# Patient Record
Sex: MALE | Race: WHITE
[De-identification: names, ages, dates, MRNs, and addresses within clinical notes are randomized per-mention and may not be internally consistent; named-entity substitution may affect disease eponyms.]

---

## 2019-10-15 ENCOUNTER — HOSPITAL ENCOUNTER (OUTPATIENT)
Dept: HOSPITAL 25 - CHICATH | Age: 69
Discharge: ANOTHER HOSPITAL | End: 2019-10-15
Attending: SPECIALIST
Payer: MEDICARE

## 2019-10-15 VITALS — DIASTOLIC BLOOD PRESSURE: 65 MMHG | SYSTOLIC BLOOD PRESSURE: 128 MMHG

## 2019-10-15 DIAGNOSIS — G47.33: ICD-10-CM

## 2019-10-15 DIAGNOSIS — Z79.84: ICD-10-CM

## 2019-10-15 DIAGNOSIS — E11.9: ICD-10-CM

## 2019-10-15 DIAGNOSIS — Z87.891: ICD-10-CM

## 2019-10-15 DIAGNOSIS — I25.119: Primary | ICD-10-CM

## 2019-10-15 DIAGNOSIS — I10: ICD-10-CM

## 2019-10-15 DIAGNOSIS — E78.00: ICD-10-CM

## 2019-10-15 DIAGNOSIS — K21.9: ICD-10-CM

## 2019-10-15 LAB
ANION GAP SERPL CALC-SCNC: 9 MMOL/L (ref 2–11)
BUN SERPL-MCNC: 14 MG/DL (ref 6–24)
BUN/CREAT SERPL: 19.2 (ref 8–20)
CALCIUM SERPL-MCNC: 9.4 MG/DL (ref 8.6–10.3)
CHLORIDE SERPL-SCNC: 103 MMOL/L (ref 101–111)
GLUCOSE SERPL-MCNC: 181 MG/DL (ref 70–100)
HCO3 SERPL-SCNC: 25 MMOL/L (ref 22–32)
POTASSIUM SERPL-SCNC: 4 MMOL/L (ref 3.5–5)
SODIUM SERPL-SCNC: 137 MMOL/L (ref 135–145)

## 2019-10-15 PROCEDURE — 99157 MOD SED OTHER PHYS/QHP EA: CPT

## 2019-10-15 PROCEDURE — 99156 MOD SED OTH PHYS/QHP 5/>YRS: CPT

## 2019-10-15 PROCEDURE — 36415 COLL VENOUS BLD VENIPUNCTURE: CPT

## 2019-10-15 PROCEDURE — 93458 L HRT ARTERY/VENTRICLE ANGIO: CPT

## 2019-10-15 PROCEDURE — 80048 BASIC METABOLIC PNL TOTAL CA: CPT

## 2019-10-15 NOTE — CATH
*[facility]*

[place] [address]

Main: [phone]

Fax: [phone]

[email or web address]



-------------------------------------------------------------------

Cardiac Catheterization



Patient: Reji Sheikh             MRN:        [id number]

:     1950                         Study Date: 10/15/2019

Age:     69                                 Accession#: [id number]

Gender:  M                                  HR:

Height:  67 in /170.2 cm                    BSA:        2.36 m^2

Weight:  247.9 lb /112.7 kg                 BMI:        38.9 kg/m^2



Cardiologist:        Shawn Cordova MD

 

Ordering Physician:  Shawn Cordova MD

Referring Physician: Shawn Cordova MD D,

 

-------------------------------------------------------------------



- Right coronary angiography.

- Left coronary angiography.

- Left heart catheterization with angiography.



-------------------------------------------------------------------



Summary:



1. LAD: Mid-vessel lesion: There is a 90% stenosis.

2. 1st diagonal: Proximal vessel lesion: There is a 90% stenosis.

3. Left circumflex: Proximal vessel lesion: There is a 95%

   stenosis. Mid-vessel lesion: There is a 90% stenosis.

4. 1st obtuse marginal: Proximal vessel lesion: There is a 90%

   stenosis.

5. 2nd obtuse marginal: Proximal vessel lesion: There is a 90%

   stenosis.

6. Right coronary: Proximal vessel lesion: There is a 100%

   stenosis.

7. Left ventricle: Systolic function is normal. The estimated

   ejection fraction is 55-60%.

8. Severe 3 vessle CAD.



Recommendations:  Urgent CABG Case discussed with Dr Martines.

 

-------------------------------------------------------------------

History:   Risk factors:  Hypertension. Diabetes mellitus; on

therapy with insulin.  Medications:   The patient received no

antianginal therapy in the last two weeks.



Labs, prior tests, procedures, and surgery:

Stress myocardial perfusion imaging.     Abnormal.   High risk of

ischemia.



Blood tests:    International normalized ratio (INR) of 1.02.

Partial thromboplastin time (PTT) of 34.7 sec.  Serum sodium (Na)

of 136 mEq/l.  Serum creatinine (current admission) of 0.71 mg/dl.

Blood urea nitrogen of 14 mg/dl.  Glucose of 248 mg/dl.  Platelet

count of 222 th/ul.  White blood cell count (WBC) of 0.01 th/ul.

Red blood [place] (R[place]) [place] 47003 th/ul.  Hematocrit of 45 %.

Hemoglobin (pre-procedure) of 15.1 g/dl.



-------------------------------------------------------------------

Study data:   Study status:  Cardiac cath: elective.  Location:

Catheterization laboratory.  Consent:  The risks, benefits, and

alternatives to the procedure were explained to the patient and/or

their healthcare representative and written informed consent was

obtained. All available pre-procedure labs were reviewed.  Height:

170.2 cm. 67 in.  Weight:  112.7 kg. 247.9 lb.  Body surface area:

  2.36 m^2.  Body mass index:  38.9 kg/m^2.



-------------------------------------------------------------------

Procedure:



1.  Initial setup. The patient was brought to the laboratory.

    Surface ECG leads, blood pressure measurements, and pulse

    oximetric signals were monitored. A baseline seven lead ECG was

    recorded. A time out was observed per protocol.

2.  Skin preparation. The planned puncture sites were prepped and

    draped in the usual sterile manner.

3.  Local anesthesia. 1% lidocaine was administered.

4.  Sedation. was administered.

5.  Supplemental oxygen. Oxygen, 2 L/min was administered

    throughout the procedure.

6.  Local anesthesia. 1% lidocaine (1 ml) was administered.

7.  Right radial artery access. A 6F Glidesheath Slender sheath was

    advanced into the vessel.

8.  Selective right coronary angiography. A 5F TIG 4.0 catheter was

    advanced into the right coronary vessel ostium under

    fluoroscopic guidance. Contrast was injected. Images were

    obtained in multiple projections.

9.  Selective left coronary angiography. A 5F FL 3.5 Diagnostic

    Impulse catheter was advanced into the left coronary vessel

    ostium under fluoroscopic guidance. Contrast was injected.

    Images were obtained in multiple projections.

10. Left heart catheterization with angiography. A 5F PIG Short

    Radial catheter was advanced across the aortic valve to the

    left ventricle under fluoroscopic guidance. 28 ml of contrast

    was injected at 14 ml/s.

11. Right radial artery hemostasis. Vessel closure was achieved

    with a Regular Vasc Band device.



Study completion:  Minimal estimated blood loss. All catheters

inserted during the procedure were removed. There were no apparent

complications.  Administered medications:   VALIUM (Diazepam), 5mg,

PO.  BENADRYL (Diphenhydramine), 25mg, PO.  VERSED (Midazolam),

1mg, IV.  Fentanyl, 25mcg, IV.  (Radial) Nitroglycerin, 300mcg,

intra-arterially.  (Radial) Verapamil, 3mg, intra-arterially.

(Radial) Heparin, 3,000units, intra-arterially.  Contrast:

Omnipaque 350 70 ml (total dose).  Omnipaque 350 330 ml (wasted).

Radiation:  Fluoroscopy dose: 147.3 cGy.  Discharge:  The patient

tolerated the procedure well and was discharged from the lab in

stable condition.



-------------------------------------------------------------------

Findings



Coronary arteries:   The coronary circulation is right dominant.

Left main:  Normal, 0% stenosis.

LAD:  Mid-vessel lesion: There is a 90% stenosis.

1st diagonal:  Severe diffuse disease.  Proximal vessel lesion:

There is a 90% stenosis.

Left circumflex:  Proximal vessel lesion: There is a 95% stenosis.

Mid-vessel lesion: There is a 90% stenosis.

1st obtuse marginal:  Proximal vessel lesion: There is a 90%

stenosis.

2nd obtuse marginal:  Proximal vessel lesion: There is a 90%

stenosis.

Right coronary:  Proximal vessel lesion: There is a 100% stenosis.

Left ventricle:  Systolic function is normal. The estimated

ejection fraction is 55-60%.

Hemodynamics:



+-------------------------+-----------------------------+

|Stage description        |Condition 1 -                |

+-------------------------+-----------------------------+

|LV pressure s/d, ed      |119/7, 16, dP/qy=3599 mm Hg/s|

+-------------------------+-----------------------------+

|Arterial pressure s/d (m)|119/61 (88)                  |

+-------------------------+-----------------------------+



Prepared and electronically signed by



Shawn Cordova MD

10/15/2019 12:33

## 2019-10-15 NOTE — TRS
CC:  Dr. Rick Boland; Cardiothoracic Surgery, [facility], [place], [place] [address] *

 

TRANSFER SUMMARY:

 

DATE OF ADMISSION:  10/15/19 - [facility] CATH

 

DATE OF TRANSFER:  10/15/19

 

INDICATION FOR TRANSFER:  Coronary artery disease, coronary artery bypass 
surgery.

 

The patient is a 69-year-old gentleman with history of diabetes and recent 
anginal type symptoms.  Please see my dictated history and physical for details 
of this presentation.

 

The patient does have crescendo angina.  He did have a positive stress test. 
Cardiac catheterization was recommended.

 

The patient underwent cardiac catheterization today, which demonstrated normal 
LV size and systolic function.  No significant mitral regurgitation.  His 
coronary artery showed three-vessel coronary artery disease.  He had 95% 
stenosis to his left circumflex artery, 90% stenosis to his mid LAD and an 
occluded right coronary artery with left-to-right collaterals.  Because of the 
severity of the disease, it was decided that the patient needed to be 
transferred directly to a tertiary center for coronary artery bypass surgery.  
The patient elected to go to [facility].

 

His medications are the same as admission.

 

On physical exam, vital signs are stable.  Cardiac exam; S1 and S2 without any 
murmurs, rubs, or gallops.  Lungs are clear to auscultation. Extremities show 
no edema. He has 2+ pulses throughout. The patient is awake, alert, and 
oriented. He moves all 4 extremities equally.

 

DISPOSITION:  The patient will be transferred to [facility] under 
the care of the cardiothoracic surgery department in preparation for coronary 
artery bypass surgery.

 

FOLLOWUP:  The patient will follow up in my office in 3 to 4 weeks. 



CONDITION AT DISCHARGE:  Stable.

 

I spent approximately an hour and a half arranging the transfer of this patient 
talking to multiple practitioners and making phone calls as well as talking to 
the patient.

 

 [id number]/CPS #: [id number]

ALDA